# Patient Record
Sex: FEMALE
[De-identification: names, ages, dates, MRNs, and addresses within clinical notes are randomized per-mention and may not be internally consistent; named-entity substitution may affect disease eponyms.]

---

## 2022-03-25 PROBLEM — Z00.00 ENCOUNTER FOR PREVENTIVE HEALTH EXAMINATION: Status: ACTIVE | Noted: 2022-03-25

## 2022-03-30 ENCOUNTER — APPOINTMENT (OUTPATIENT)
Dept: COLORECTAL SURGERY | Facility: CLINIC | Age: 52
End: 2022-03-30
Payer: MEDICAID

## 2022-03-30 VITALS
BODY MASS INDEX: 24.11 KG/M2 | HEIGHT: 62 IN | HEART RATE: 77 BPM | DIASTOLIC BLOOD PRESSURE: 85 MMHG | SYSTOLIC BLOOD PRESSURE: 126 MMHG | WEIGHT: 131 LBS | TEMPERATURE: 97.2 F

## 2022-03-30 DIAGNOSIS — R10.31 RIGHT LOWER QUADRANT PAIN: ICD-10-CM

## 2022-03-30 DIAGNOSIS — Z86.19 PERSONAL HISTORY OF OTHER INFECTIOUS AND PARASITIC DISEASES: ICD-10-CM

## 2022-03-30 DIAGNOSIS — Z86.018 PERSONAL HISTORY OF OTHER BENIGN NEOPLASM: ICD-10-CM

## 2022-03-30 DIAGNOSIS — Z92.89 PERSONAL HISTORY OF OTHER MEDICAL TREATMENT: ICD-10-CM

## 2022-03-30 PROCEDURE — 99204 OFFICE O/P NEW MOD 45 MIN: CPT

## 2022-03-30 RX ORDER — OMEPRAZOLE 40 MG/1
40 CAPSULE, DELAYED RELEASE ORAL
Refills: 0 | Status: ACTIVE | COMMUNITY

## 2022-03-30 NOTE — PHYSICAL EXAM
[Abdomen Masses] : No abdominal masses [Abdomen Tenderness] : ~T No ~M abdominal tenderness [No HSM] : no hepatosplenomegaly [JVD] : no jugular venous distention  [Normal Breath Sounds] : Normal breath sounds [Normal Heart Sounds] : normal heart sounds [No Rash or Lesion] : No rash or lesion [Alert] : alert

## 2022-03-30 NOTE — HISTORY OF PRESENT ILLNESS
[FreeTextEntry1] : Pt is a 51 yo F who presents today for initial evaluation\par PMH Hep B (not on medication), anemia (resolved)\par PSH  , uterine fibroid removal , thyroid nodule removal \par \par Referred by Dr. 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 \par DDX per Dr. ALCANTAR c/f superiorly oriented right ovarian cyst vs. appendiceal mucocele.\par of note pt has EGD scheduled 2022 for gas per pt \par \par c/o abdominal pain x 3 years, w/out obvious aggravating factors, intermittent, lasts for 1 week at a time. Feels like "heaviness near rectum" and ache, Pain radiates to lower pelvis into rectum. associated with diarrhea 3 episodes in the past, in which pt has diarrhea and then reports abdominal pain- last occurred Dec 2021 in which pt went to see Dr. Alcantar a few days later, CT ordered at that time. Reports recurs 3x per year. has taken tylenol with temporary relief.\par \par Denies rectal bleeding, n/v, fever, chills, wt loss, pain with eating \par \par admits to h/o ongoing constipation \par BH: every other day, with straining, BRBPR on TP \par reports adequate intake of dietary fiber and admits to 2-3 cups water daily\par Pt has tried stool softener prn, colace, with temporary relief, last taken 3-4 months ago. \par \par Denies FMH colorectal CA\par Denies ASA/NSAIDs in last 7 days\par

## 2022-03-30 NOTE — ASSESSMENT
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  She reports a recent gynecological examination revealed no ovarian pathology.  I have recommended we proceed with an MRI to further evaluate the cystic process.  However, I have outlined to her the role of a diagnostic laparoscopy and potential appendectomy with excision of this cystic process–potential mucocele versus secondary mesenteric cyst.  The risk, benefits and alternatives of surgery were outlined.  All questions were answered.

## 2022-04-15 ENCOUNTER — OUTPATIENT (OUTPATIENT)
Dept: OUTPATIENT SERVICES | Facility: HOSPITAL | Age: 52
LOS: 1 days | End: 2022-04-15

## 2022-04-15 ENCOUNTER — RESULT REVIEW (OUTPATIENT)
Age: 52
End: 2022-04-15

## 2022-04-15 ENCOUNTER — APPOINTMENT (OUTPATIENT)
Dept: MRI IMAGING | Facility: CLINIC | Age: 52
End: 2022-04-15
Payer: MEDICAID

## 2022-04-15 PROCEDURE — 74183 MRI ABD W/O CNTR FLWD CNTR: CPT | Mod: 26

## 2022-04-15 PROCEDURE — 72197 MRI PELVIS W/O & W/DYE: CPT | Mod: 26

## 2022-04-21 ENCOUNTER — NON-APPOINTMENT (OUTPATIENT)
Age: 52
End: 2022-04-21

## 2022-04-22 ENCOUNTER — NON-APPOINTMENT (OUTPATIENT)
Age: 52
End: 2022-04-22